# Patient Record
Sex: FEMALE | Race: WHITE
[De-identification: names, ages, dates, MRNs, and addresses within clinical notes are randomized per-mention and may not be internally consistent; named-entity substitution may affect disease eponyms.]

---

## 2018-02-26 ENCOUNTER — HOSPITAL ENCOUNTER (EMERGENCY)
Dept: HOSPITAL 89 - ER | Age: 23
LOS: 1 days | Discharge: HOME | End: 2018-02-27
Payer: COMMERCIAL

## 2018-02-26 VITALS — WEIGHT: 160 LBS | BODY MASS INDEX: 25.11 KG/M2 | HEIGHT: 67 IN

## 2018-02-26 VITALS — SYSTOLIC BLOOD PRESSURE: 102 MMHG | DIASTOLIC BLOOD PRESSURE: 63 MMHG

## 2018-02-26 DIAGNOSIS — K52.9: Primary | ICD-10-CM

## 2018-02-26 DIAGNOSIS — E86.0: ICD-10-CM

## 2018-02-26 LAB — PLATELET COUNT, AUTOMATED: 238 K/UL (ref 150–450)

## 2018-02-26 PROCEDURE — 87491 CHLMYD TRACH DNA AMP PROBE: CPT

## 2018-02-26 PROCEDURE — 80305 DRUG TEST PRSMV DIR OPT OBS: CPT

## 2018-02-26 PROCEDURE — 84703 CHORIONIC GONADOTROPIN ASSAY: CPT

## 2018-02-26 PROCEDURE — 96375 TX/PRO/DX INJ NEW DRUG ADDON: CPT

## 2018-02-26 PROCEDURE — 82947 ASSAY GLUCOSE BLOOD QUANT: CPT

## 2018-02-26 PROCEDURE — 96361 HYDRATE IV INFUSION ADD-ON: CPT

## 2018-02-26 PROCEDURE — 84520 ASSAY OF UREA NITROGEN: CPT

## 2018-02-26 PROCEDURE — 82310 ASSAY OF CALCIUM: CPT

## 2018-02-26 PROCEDURE — 96376 TX/PRO/DX INJ SAME DRUG ADON: CPT

## 2018-02-26 PROCEDURE — 85025 COMPLETE CBC W/AUTO DIFF WBC: CPT

## 2018-02-26 PROCEDURE — 87210 SMEAR WET MOUNT SALINE/INK: CPT

## 2018-02-26 PROCEDURE — 96374 THER/PROPH/DIAG INJ IV PUSH: CPT

## 2018-02-26 PROCEDURE — 84450 TRANSFERASE (AST) (SGOT): CPT

## 2018-02-26 PROCEDURE — 84155 ASSAY OF PROTEIN SERUM: CPT

## 2018-02-26 PROCEDURE — 74177 CT ABD & PELVIS W/CONTRAST: CPT

## 2018-02-26 PROCEDURE — 84295 ASSAY OF SERUM SODIUM: CPT

## 2018-02-26 PROCEDURE — 87591 N.GONORRHOEAE DNA AMP PROB: CPT

## 2018-02-26 PROCEDURE — 83690 ASSAY OF LIPASE: CPT

## 2018-02-26 PROCEDURE — 82247 BILIRUBIN TOTAL: CPT

## 2018-02-26 PROCEDURE — 84460 ALANINE AMINO (ALT) (SGPT): CPT

## 2018-02-26 PROCEDURE — 83605 ASSAY OF LACTIC ACID: CPT

## 2018-02-26 PROCEDURE — 82565 ASSAY OF CREATININE: CPT

## 2018-02-26 PROCEDURE — 82435 ASSAY OF BLOOD CHLORIDE: CPT

## 2018-02-26 PROCEDURE — 81001 URINALYSIS AUTO W/SCOPE: CPT

## 2018-02-26 PROCEDURE — 82374 ASSAY BLOOD CARBON DIOXIDE: CPT

## 2018-02-26 PROCEDURE — 82040 ASSAY OF SERUM ALBUMIN: CPT

## 2018-02-26 PROCEDURE — 84075 ASSAY ALKALINE PHOSPHATASE: CPT

## 2018-02-26 PROCEDURE — 82150 ASSAY OF AMYLASE: CPT

## 2018-02-26 PROCEDURE — 84132 ASSAY OF SERUM POTASSIUM: CPT

## 2018-02-26 PROCEDURE — 99284 EMERGENCY DEPT VISIT MOD MDM: CPT

## 2018-02-26 NOTE — ER REPORT
History and Physical


Time Seen By MD:  19:59


Hx. of Stated Complaint:  


2 DAYS OF NAUSEA.  GENERALIZED ABD PAIN, BLOATING.  DIARRHEA AND CONSTIPATION.


HPI/ROS


22 year old female ill x 4 days started with generalized and pain now 

periumbilical worse on the left,


Allergies:  


Coded Allergies:  


     Penicillins (Verified  Allergy, Intermediate, hives, 7/14/17)


     codeine (Verified  Allergy, Unknown, 7/14/17)


Home Meds


Active Scripts


Ondansetron (ZOFRAN ODT) 4 Mg Tab.rapdis, 4 MG PO Q6H, #30 TAB.SOL


   Prov:KEVIN GASTON         2/26/18


Reported Medications


Amitriptyline Hcl (AMITRIPTYLINE HCL) 25 Mg Tablet, 50 MG PO QHS, #10 TAB


   7/14/17


Past Medical/Surgical History


Recurrent UTIs, mirena iud


Hx Smoking:  No


Smoking Status:  Never Smoker


Hx Substance Use Disorder:  No


Hx Alcohol Use:  Yes


Constitutional





Vital Sign - Last 24 Hours








 2/26/18





 19:53


 


Temp 97.2


 


Pulse 94


 


B/P (MAP) 128/85


 


Pulse Ox 98








Physical Exam


22 year old female skin pale warm and dry, alert and oriented moderate distress

, tm non reddened, throat non reddened, lungs cta, abd hyperactive bs 

periumbilical pain ,





Medical Decision Making


Data Points


Result Diagram:  


2/26/18 2025 2/26/18 2025





Laboratory





Hematology








Test


  2/26/18


00:00 2/26/18


20:25 2/26/18


20:26 2/26/18


22:20


 


Urine Color Straw    


 


Urine Clarity Clear    


 


Urine pH


  6.0 pH


(4.8-9.5) 


  


  


 


 


Urine Specific Gravity 1.008    


 


Urine Protein


  Negative mg/dL


(NEGATIVE) 


  


  


 


 


Urine Glucose (UA)


  Negative mg/dL


(NEGATIVE) 


  


  


 


 


Urine Ketones


  20 mg/dL


(NEGATIVE) 


  


  


 


 


Urine Blood


  Negative


(NEGATIVE) 


  


  


 


 


Urine Nitrite


  Negative


(NEGATIVE) 


  


  


 


 


Urine Bilirubin


  Negative


(NEGATIVE) 


  


  


 


 


Urine Urobilinogen


  Negative mg/dL


(0.2-1.9) 


  


  


 


 


Urine Leukocyte Esterase


  Negative


(NEGATIVE) 


  


  


 


 


Urine RBC


  <1 /HPF


(0-2/HPF) 


  


  


 


 


Urine WBC


  None /HPF


(0-5/HPF) 


  


  


 


 


Urine Squamous Epithelial


Cells Many /LPF


(</=FEW) 


  


  


 


 


Urine Bacteria


  Few /HPF


(NONE-FEW) 


  


  


 


 


Urine Mucus


  None /HPF


(NONE-FEW) 


  


  


 


 


Red Blood Count


  


  5.38 M/uL


(4.17-5.56) 


  


 


 


Mean Corpuscular Volume


  


  93.4 fL


(80.0-96.0) 


  


 


 


Mean Corpuscular Hemoglobin


  


  32.3 pg


(26.0-33.0) 


  


 


 


Mean Corpuscular Hemoglobin


Concent 


  34.6 g/dL


(32.0-36.0) 


  


 


 


Red Cell Distribution Width


  


  13.2 %


(11.5-14.5) 


  


 


 


Mean Platelet Volume


  


  8.2 fL


(7.2-11.1) 


  


 


 


Neutrophils (%) (Auto)


  


  85.5 %


(39.4-72.5) 


  


 


 


Lymphocytes (%) (Auto)


  


  9.3 %


(17.6-49.6) 


  


 


 


Monocytes (%) (Auto)


  


  4.4 %


(4.1-12.4) 


  


 


 


Eosinophils (%) (Auto)


  


  0.6 %


(0.4-6.7) 


  


 


 


Basophils (%) (Auto)


  


  0.2 %


(0.3-1.4) 


  


 


 


Nucleated RBC Relative Count


(auto) 


  0.1 /100WBC 


  


  


 


 


Neutrophils # (Auto)


  


  6.6 K/uL


(2.0-7.4) 


  


 


 


Lymphocytes # (Auto)


  


  0.7 K/uL


(1.3-3.6) 


  


 


 


Monocytes # (Auto)


  


  0.3 K/uL


(0.3-1.0) 


  


 


 


Eosinophils # (Auto)


  


  0.1 K/uL


(0.0-0.5) 


  


 


 


Basophils # (Auto)


  


  0.0 K/uL


(0.0-0.1) 


  


 


 


Nucleated RBC Absolute Count


(auto) 


  0.00 K/uL 


  


  


 


 


Sodium Level


  


  140 mmol/L


(137-145) 


  


 


 


Potassium Level


  


  4.0 mmol/L


(3.5-5.0) 


  


 


 


Chloride Level


  


  102 mmol/L


() 


  


 


 


Carbon Dioxide Level


  


  25 mmol/L


(22-31) 


  


 


 


Blood Urea Nitrogen


  


  13 mg/dl


(7-18) 


  


 


 


Creatinine


  


  0.90 mg/dl


(0.52-1.04) 


  


 


 


Glomerular Filtration Rate


Calc 


  > 60.0 


  


  


 


 


Random Glucose


  


  82 mg/dl


() 


  


 


 


Lactate


  


  0.9 mmol/L


(0.7-2.1) 


  


 


 


Calcium Level


  


  9.6 mg/dl


(8.4-10.2) 


  


 


 


Total Bilirubin


  


  0.8 mg/dl


(0.2-1.3) 


  


 


 


Aspartate Amino Transf


(AST/SGOT) 


  20 U/L (0-35) 


  


  


 


 


Alanine Aminotransferase


(ALT/SGPT) 


  29 U/L (0-56) 


  


  


 


 


Alkaline Phosphatase  55 U/L (0-126)   


 


Total Protein


  


  7.9 gm/dl


(6.3-8.2) 


  


 


 


Albumin


  


  4.8 g/dl


(3.5-5.0) 


  


 


 


Amylase Level  53 U/L (0-110)   


 


Lipase


  


  115 U/L


() 


  


 


 


Human Chorionic Gonadotropin,


Qual 


  Negative


(NEGATIVE) 


  


 


 


Urine Opiates Screen   Negative  


 


Urine Barbiturates Screen   Negative  


 


Ur Tricyclic Antidepressants


Screen 


  


  Negative 


  


 


 


Urine Phencyclidine Screen   Negative  


 


Urine Amphetamines Screen   Negative  


 


Urine Benzodiazepines Screen   Negative  


 


Urine Cocaine Screen   Negative  


 


Urine Cannabinoids Screen   Negative  








Chemistry








Test


  2/26/18


00:00 2/26/18


20:25 2/26/18


20:26 2/26/18


22:20


 


Urine Color Straw    


 


Urine Clarity Clear    


 


Urine pH


  6.0 pH


(4.8-9.5) 


  


  


 


 


Urine Specific Gravity 1.008    


 


Urine Protein


  Negative mg/dL


(NEGATIVE) 


  


  


 


 


Urine Glucose (UA)


  Negative mg/dL


(NEGATIVE) 


  


  


 


 


Urine Ketones


  20 mg/dL


(NEGATIVE) 


  


  


 


 


Urine Blood


  Negative


(NEGATIVE) 


  


  


 


 


Urine Nitrite


  Negative


(NEGATIVE) 


  


  


 


 


Urine Bilirubin


  Negative


(NEGATIVE) 


  


  


 


 


Urine Urobilinogen


  Negative mg/dL


(0.2-1.9) 


  


  


 


 


Urine Leukocyte Esterase


  Negative


(NEGATIVE) 


  


  


 


 


Urine RBC


  <1 /HPF


(0-2/HPF) 


  


  


 


 


Urine WBC


  None /HPF


(0-5/HPF) 


  


  


 


 


Urine Squamous Epithelial


Cells Many /LPF


(</=FEW) 


  


  


 


 


Urine Bacteria


  Few /HPF


(NONE-FEW) 


  


  


 


 


Urine Mucus


  None /HPF


(NONE-FEW) 


  


  


 


 


White Blood Count


  


  7.7 k/uL


(4.5-11.0) 


  


 


 


Red Blood Count


  


  5.38 M/uL


(4.17-5.56) 


  


 


 


Hemoglobin


  


  17.4 g/dL


(12.0-16.0) 


  


 


 


Hematocrit


  


  50.3 %


(34.0-47.0) 


  


 


 


Mean Corpuscular Volume


  


  93.4 fL


(80.0-96.0) 


  


 


 


Mean Corpuscular Hemoglobin


  


  32.3 pg


(26.0-33.0) 


  


 


 


Mean Corpuscular Hemoglobin


Concent 


  34.6 g/dL


(32.0-36.0) 


  


 


 


Red Cell Distribution Width


  


  13.2 %


(11.5-14.5) 


  


 


 


Platelet Count


  


  238 K/uL


(150-450) 


  


 


 


Mean Platelet Volume


  


  8.2 fL


(7.2-11.1) 


  


 


 


Neutrophils (%) (Auto)


  


  85.5 %


(39.4-72.5) 


  


 


 


Lymphocytes (%) (Auto)


  


  9.3 %


(17.6-49.6) 


  


 


 


Monocytes (%) (Auto)


  


  4.4 %


(4.1-12.4) 


  


 


 


Eosinophils (%) (Auto)


  


  0.6 %


(0.4-6.7) 


  


 


 


Basophils (%) (Auto)


  


  0.2 %


(0.3-1.4) 


  


 


 


Nucleated RBC Relative Count


(auto) 


  0.1 /100WBC 


  


  


 


 


Neutrophils # (Auto)


  


  6.6 K/uL


(2.0-7.4) 


  


 


 


Lymphocytes # (Auto)


  


  0.7 K/uL


(1.3-3.6) 


  


 


 


Monocytes # (Auto)


  


  0.3 K/uL


(0.3-1.0) 


  


 


 


Eosinophils # (Auto)


  


  0.1 K/uL


(0.0-0.5) 


  


 


 


Basophils # (Auto)


  


  0.0 K/uL


(0.0-0.1) 


  


 


 


Nucleated RBC Absolute Count


(auto) 


  0.00 K/uL 


  


  


 


 


Glomerular Filtration Rate


Calc 


  > 60.0 


  


  


 


 


Lactate


  


  0.9 mmol/L


(0.7-2.1) 


  


 


 


Calcium Level


  


  9.6 mg/dl


(8.4-10.2) 


  


 


 


Total Bilirubin


  


  0.8 mg/dl


(0.2-1.3) 


  


 


 


Aspartate Amino Transf


(AST/SGOT) 


  20 U/L (0-35) 


  


  


 


 


Alanine Aminotransferase


(ALT/SGPT) 


  29 U/L (0-56) 


  


  


 


 


Alkaline Phosphatase  55 U/L (0-126)   


 


Total Protein


  


  7.9 gm/dl


(6.3-8.2) 


  


 


 


Albumin


  


  4.8 g/dl


(3.5-5.0) 


  


 


 


Amylase Level  53 U/L (0-110)   


 


Lipase


  


  115 U/L


() 


  


 


 


Human Chorionic Gonadotropin,


Qual 


  Negative


(NEGATIVE) 


  


 


 


Urine Opiates Screen   Negative  


 


Urine Barbiturates Screen   Negative  


 


Ur Tricyclic Antidepressants


Screen 


  


  Negative 


  


 


 


Urine Phencyclidine Screen   Negative  


 


Urine Amphetamines Screen   Negative  


 


Urine Benzodiazepines Screen   Negative  


 


Urine Cocaine Screen   Negative  


 


Urine Cannabinoids Screen   Negative  








Toxicology








Test


  2/26/18


20:26


 


Urine Opiates Screen Negative 


 


Urine Barbiturates Screen Negative 


 


Ur Tricyclic Antidepressants


Screen Negative 


 


 


Urine Phencyclidine Screen Negative 


 


Urine Amphetamines Screen Negative 


 


Urine Benzodiazepines Screen Negative 


 


Urine Cocaine Screen Negative 


 


Urine Cannabinoids Screen Negative 








Urinalysis








Test


  2/26/18


00:00


 


Urine Color Straw 


 


Urine Clarity Clear 


 


Urine pH


  6.0 pH


(4.8-9.5)


 


Urine Specific Gravity 1.008 


 


Urine Protein


  Negative mg/dL


(NEGATIVE)


 


Urine Glucose (UA)


  Negative mg/dL


(NEGATIVE)


 


Urine Ketones


  20 mg/dL


(NEGATIVE)


 


Urine Blood


  Negative


(NEGATIVE)


 


Urine Nitrite


  Negative


(NEGATIVE)


 


Urine Bilirubin


  Negative


(NEGATIVE)


 


Urine Urobilinogen


  Negative mg/dL


(0.2-1.9)


 


Urine Leukocyte Esterase


  Negative


(NEGATIVE)


 


Urine RBC


  <1 /HPF


(0-2/HPF)


 


Urine WBC


  None /HPF


(0-5/HPF)


 


Urine Squamous Epithelial


Cells Many /LPF


(</=FEW)


 


Urine Bacteria


  Few /HPF


(NONE-FEW)


 


Urine Mucus


  None /HPF


(NONE-FEW)








Microbiology





Microbiology








 Date/Time


Source Procedure


Growth Status


 


 


 2/26/18 22:20


Vaginal Wet Prep - Final Complete











ED Course/Re-evaluation


Clinical Indication for ER IV:  Hydration


ED Course


White blood cell count is normal urine is negative for infection patient 

persistently vomiting after dose with Phenergan and Zofran will CT her abdomen 

rule out obstruction she is still point tender left lower quadrant


Procedure


Pelvic exam done scant white vaginal discharge cultures were obtained no 

cervical motion tenderness no adnexal tenderness


Decision to Disposition Date:  Feb 26, 2018


Decision to Disposition Time:  22:53





Depart


Departure


Latest Vital Signs





Vital Signs








  Date Time  Temp Pulse Resp B/P (MAP) Pulse Ox O2 Delivery O2 Flow Rate FiO2


 


2/26/18 19:53 97.2 94  128/85 98   








Impression:  


 Primary Impression:  


 Enteritis


 Additional Impressions:  


 Nausea and vomiting


 Dehydration


Condition:  Improved


Disposition:  HOME OR SELF-CARE


Referrals:  


SHANTHI MAGUIRE (PCP)


1 Day


New Scripts


Ondansetron (ZOFRAN ODT) 4 Mg Tab.rapdis


4 MG PO Q6H, #30 TAB.SOL


   Prov: KEVIN GASTON         2/26/18


Patient Instructions:  Enteritis (ED)





Additional Instructions:  


We'll send home with Zofran for nausea is take a tablet wait for 1 hour then 

start sipping clear liquids return if you have worsening of symptoms and able 

to keep liquids down this should get progressively better than the next 24 hours





Problem Qualifiers











KEVIN GASTON Feb 26, 2018 19:59

## 2018-02-26 NOTE — RADIOLOGY IMAGING REPORT
FACILITY: [facility] [place] 

 

PATIENT NAME: Nadia Paulino

: 1995

MR: [id number]

V: [id number]

EXAM DATE: 

ORDERING PHYSICIAN: KEVIN GASTON

TECHNOLOGIST: 

 

Location: [facility]

Patient: Nadia Paulino

: 1995

MRN: [id number]

Visit/Account:[id number]

Date of Sevice:  2018

 

ACCESSION #: [id number]

 

EXAMINATION: CT abdomen and pelvis with IV contrast

 

HISTORY:  Lower abdominal pain.

 

TECHNIQUE:   Axial CT images of the abdomen and pelvis were obtained with IV contrast, with coronal a
nd sagittal 2D reconstructed images.

One of the following dose optimization techniques was utilized in the performance of this exam: Autom
ated exposure control; adjustment of the mA and/or kV according to the patient's size; or use of an i
terative  reconstruction technique.  Specific details can be referenced in the facility's radiology C
T exam operational policy.

 

Contrast:  75 mL of IV Isovue-370.

 

COMPARISON:  None.

 

FINDINGS:

Liver:  Negative.

Gallbladder and bile ducts:  Cholecystectomy. No bile duct dilatation.

Spleen:  Negative.

Pancreas:  Negative.

Adrenal glands:  Negative.

Kidneys:  Negative. No hydronephrosis or urinary calculi.

 

Bowel and peritoneum:  The small bowel and colon are normal in caliber. No bowel obstruction. There i
s increased fluid present within nondilated small bowel loops in the lower abdomen and pelvis. No loc
alized bowel wall thickening. Unremarkable appendix in the right lower quadrant. Trace amount of free
 fluid in the pelvis. No free intraperitoneal air.

Pelvic  structures:  IUD present in the central uterus. No large adnexal cyst in the pelvis.

Lymph node assessment:  Negative.

Vessels:  Negative.

 

Musculoskeletal:   Negative.

Body wall:  Negative.

Lung bases:  Negative.

 

IMPRESSION:

1. There is increased fluid present within nondilated small bowel loops in the lower abdomen and pelv
is. No localized bowel wall thickening. Appearance is nonspecific but may be compatible with a genera
lized enteritis.

2. Trace amount of free fluid in the pelvis.

3. The visualized appendix is unremarkable.

4. Cholecystectomy.

 

 

Report Dictated By: Gregg Eduardo MD at 2018 10:16 PM

 

Report E-Signed By: Gregg Eduardo MD  at 2018 10:20 PM

 

WSN:[id number]

## 2018-05-09 ENCOUNTER — HOSPITAL ENCOUNTER (OUTPATIENT)
Dept: HOSPITAL 89 - LAB | Age: 23
End: 2018-05-09
Attending: OBSTETRICS & GYNECOLOGY
Payer: COMMERCIAL

## 2018-05-09 DIAGNOSIS — R82.79: ICD-10-CM

## 2018-05-09 DIAGNOSIS — R10.2: Primary | ICD-10-CM

## 2018-05-09 PROCEDURE — 87088 URINE BACTERIA CULTURE: CPT

## 2018-05-29 ENCOUNTER — HOSPITAL ENCOUNTER (OUTPATIENT)
Dept: HOSPITAL 89 - LAB | Age: 23
End: 2018-05-29
Attending: OBSTETRICS & GYNECOLOGY
Payer: COMMERCIAL

## 2018-05-29 DIAGNOSIS — R10.2: Primary | ICD-10-CM

## 2018-05-29 DIAGNOSIS — R82.79: ICD-10-CM

## 2018-05-29 PROCEDURE — 87088 URINE BACTERIA CULTURE: CPT

## 2018-05-29 PROCEDURE — 81001 URINALYSIS AUTO W/SCOPE: CPT
